# Patient Record
Sex: MALE | Race: ASIAN | NOT HISPANIC OR LATINO | URBAN - METROPOLITAN AREA
[De-identification: names, ages, dates, MRNs, and addresses within clinical notes are randomized per-mention and may not be internally consistent; named-entity substitution may affect disease eponyms.]

---

## 2019-10-05 ENCOUNTER — COMMUNICATION - HEALTHEAST (OUTPATIENT)
Dept: TELEHEALTH | Facility: CLINIC | Age: 49
End: 2019-10-05

## 2019-10-05 ENCOUNTER — OFFICE VISIT - HEALTHEAST (OUTPATIENT)
Dept: FAMILY MEDICINE | Facility: CLINIC | Age: 49
End: 2019-10-05

## 2019-10-05 DIAGNOSIS — Z60.3 LANGUAGE BARRIER: ICD-10-CM

## 2019-10-05 DIAGNOSIS — R03.0 ELEVATED BLOOD PRESSURE READING WITHOUT DIAGNOSIS OF HYPERTENSION: ICD-10-CM

## 2019-10-05 DIAGNOSIS — Z75.8 LANGUAGE BARRIER: ICD-10-CM

## 2019-10-05 LAB
ALBUMIN UR-MCNC: NEGATIVE MG/DL
ANION GAP SERPL CALCULATED.3IONS-SCNC: 10 MMOL/L (ref 5–18)
APPEARANCE UR: CLEAR
BILIRUB UR QL STRIP: NEGATIVE
BUN SERPL-MCNC: 19 MG/DL (ref 8–22)
CHLORIDE BLD-SCNC: 102 MMOL/L (ref 98–107)
CO2 SERPL-SCNC: 32 MMOL/L (ref 22–31)
COLOR UR AUTO: YELLOW
CREAT SERPL-MCNC: 1.3 MG/DL (ref 0.8–1.5)
ERYTHROCYTE [DISTWIDTH] IN BLOOD BY AUTOMATED COUNT: 11.1 % (ref 11–14.5)
GLUCOSE BLD-MCNC: 97 MG/DL (ref 70–125)
GLUCOSE UR STRIP-MCNC: NEGATIVE MG/DL
HCT VFR BLD AUTO: 53.2 % (ref 40–54)
HGB BLD-MCNC: 17.8 G/DL (ref 14–18)
HGB UR QL STRIP: NEGATIVE
KETONES UR STRIP-MCNC: NEGATIVE MG/DL
LEUKOCYTE ESTERASE UR QL STRIP: NEGATIVE
MCH RBC QN AUTO: 29.6 PG (ref 27–34)
MCHC RBC AUTO-ENTMCNC: 33.5 G/DL (ref 32–36)
MCV RBC AUTO: 88 FL (ref 80–100)
NITRATE UR QL: NEGATIVE
PH UR STRIP: 7 [PH] (ref 5–8)
PLATELET # BLD AUTO: 243 THOU/UL (ref 140–440)
PMV BLD AUTO: 7.8 FL (ref 7–10)
POTASSIUM BLD-SCNC: 4.1 MMOL/L (ref 3.5–5.5)
RBC # BLD AUTO: 6.03 MILL/UL (ref 4.4–6.2)
SODIUM SERPL-SCNC: 144 MMOL/L (ref 136–145)
SP GR UR STRIP: 1.02 (ref 1–1.03)
UROBILINOGEN UR STRIP-ACNC: NORMAL
WBC: 6.6 THOU/UL (ref 4–11)

## 2019-10-05 SDOH — SOCIAL STABILITY - SOCIAL INSECURITY: ACCULTURATION DIFFICULTY: Z60.3

## 2021-04-23 ENCOUNTER — AMBULATORY - HEALTHEAST (OUTPATIENT)
Dept: NURSING | Facility: CLINIC | Age: 51
End: 2021-04-23

## 2021-05-02 ENCOUNTER — HEALTH MAINTENANCE LETTER (OUTPATIENT)
Age: 51
End: 2021-05-02

## 2021-05-14 ENCOUNTER — AMBULATORY - HEALTHEAST (OUTPATIENT)
Dept: NURSING | Facility: CLINIC | Age: 51
End: 2021-05-14

## 2021-06-02 NOTE — PROGRESS NOTES
Subjective:   Bijan Briones is a 49 y.o. male and is new to [facility].  Roomed by: REYNALDO LEIGH CMA    Accompanied by Son    Refills needed? No    Do you have any forms that need to be filled out? No      Chief Complaint   Patient presents with     Hypertension     BP check this morning and was 170/100. symptoms slight headache. Nurse check pt's bp this morning and recommended pt to come to [facility] to get check.   Says a nurse came over for a life insurance policy this morning and saw that his BP was elevated. Admits having intermittent headaches in the last few days. Says that when he gets a headache that he trouble focusing. He saw an eye doctor this summer for loss of vision. Was told he has some problem with his right eye and was prescribed 3 different eye drops, including steroid eye drops. He saw several eye specialist and was told he could stop taking the eye drops. Says he did not have any more eye problems until this week. Denies CP or shortness of breath, decreased strengths, numbness or tingling in his arms or legs. Admits being able to eat, drink and urinate normally. Denies any recent nausea, vomiting, belly pain, or diarrhea.     PMH - See HPI, otherwise none  PSH - None  FX - HTN - none, DM - none, Asthma - none, CAD - none, Cancer - none  SX - Lives with wife, 12 year old daughter, son, daughter in law and 1 granddaughter; non smoker, non drinker. Works as     Patient Active Problem List   Diagnosis     Acute Bronchitis     Hyperlipidemia        Review of Systems  See HPI for ROS, otherwise balance of other systems negative  No Known Allergies  No current outpatient medications on file.    Objective:     Vitals:    10/05/19 1255 10/05/19 1318   BP: 166/88 (!) 161/93   Patient Site: Right Arm    Patient Position: Sitting    Cuff Size: Adult Regular    Pulse: (!) 55    Temp: 97.8  F (36.6  C)    TempSrc: Oral    SpO2: 96%    Weight: 147 lb 9 oz (66.9 kg)    Vital signed reviewed  Gen - Pt in NAD  Eyes  - PERRL, EOMI, Conjunctiva without injection or drainage  Ears - hearing intact; external canals - no induration, Right TM - non injected, Left TM - non injected   Nose - not congested, no nasal drainage, turbinates not inflamed, septum not deviated  Pharynx - not injected, tonsils 1+ size  Neck - supple, no cervical adenopathy, no masses  Cor - RRR w/o murmur  Lungs - Good air entry; no wheezes or crackles noted on auscultation - no coughing noted  Neuro: Oriented x 3, CN - 2-12 intact, Sensory - neg drift, Strengths - 5/5 UE/LE = , DTRs =, Coord - intact FNF   Intact tandem gait, negative Rhomberg  Psych - Affect - euthymic, well groomed, speech not pressured, good insight, no flight of ideas  Skin - no lesions, no rashes noted    Denise son  - cell [phone]    Results for orders placed or performed in visit on 10/05/19   HM2(CBC w/o Differential)   Result Value Ref Range    WBC 6.6 4.0 - 11.0 thou/uL    RBC 6.03 4.40 - 6.20 mill/uL    Hemoglobin 17.8 14.0 - 18.0 g/dL    Hematocrit 53.2 40.0 - 54.0 %    MCV 88 80 - 100 fL    MCH 29.6 27.0 - 34.0 pg    MCHC 33.5 32.0 - 36.0 g/dL    RDW 11.1 11.0 - 14.5 %    Platelets 243 140 - 440 thou/uL    MPV 7.8 7.0 - 10.0 fL   ISTAT CHEM 7 ( CLINIC ONLY)   Result Value Ref Range    Sodium 144 136 - 145 mmol/L    Potassium 4.1 3.5 - 5.5 mmol/L    CO2 32 (H) 22 - 31 mmol/L    Chloride 102 98 - 107 mmol/L    Anion Gap, Calculation 10 5 - 18 mmol/L    Glucose 97 70 - 125 mg/dL    BUN 19 8 - 22 mg/dL    Creatinine 1.30 0.80 - 1.50 mg/dL   Urinalysis Macroscopic   Result Value Ref Range    Color, UA Yellow Colorless, Yellow, Straw, Light Yellow    Clarity, UA Clear Clear    Glucose, UA Negative Negative    Bilirubin, UA Negative Negative    Ketones, UA Negative Negative    Specific Gravity, UA 1.020 1.005 - 1.030    Blood, UA Negative Negative    pH, UA 7.0 5.0 - 8.0    Protein, UA Negative Negative mg/dL    Urobilinogen, UA 0.2 E.U./dL 0.2 E.U./dL, 1.0 E.U./dL    Nitrite, UA  Negative Negative    Leukocytes, UA Negative Negative   Lab result discussed on day of visit.      Assessment - Plan   Medical Decision Making -49-year-old Hmong only speaking man is here with his son interpreting.  He states is never been told he had increased blood pressure.  His blood pressure is increased blood pressure was found subsequently by a nurse who was doing insurance evaluation this morning.  He does admit some intermittent headaches in the last several days.  Patient denies any recent chest pain, shortness of breath, decreased sensation or strengths.  Does have a history of a right eye pterygium for which she has been seen by ophthalmology.  On exam his blood pressure was elevated x2 but balance of vital signs were reassuring.  Except for his right eye pterygium, the balance of his exam was balance of exam was unremarkable, including an entirely normal neuro exam.  Discussed patient's CBC, i-STAT and urinalysis with his son by phone.  From today's exam discussed with patient and son that his blood pressure was not in the stroke range and patient was not giving any symptoms of heart problems.  Patient will follow-up with primary care within the next 7 days.  See patient instructions.    1. Elevated blood pressure reading without diagnosis of hypertension  - HM2(CBC w/o Differential)  - ISTAT CHEM 7 ([facility] CLINIC ONLY)  - Urinalysis Macroscopic  - Nursing communication    2. Language barrier  At the conclusion of the encounter, assessment and plan were discussed. All questions were answered. The patient or guardian acknowledged understanding and was involved in the decision making regarding the overall care plan.    Patient Instructions   1. Keep appointment for follow up with primary care provider  2. If symptoms are getting worse over time or you have any questions, call the clinic number - it's answered 24/7      Patient Education   What is High Blood Pressure?  High blood pressure (also called  hypertension) is known as the  silent killer.  This is because most of the time it doesn t cause symptoms. In fact, many people don t know they have it until other problems develop. In most cases, high blood pressure often requires lifelong treatment.  Understanding blood pressure  The circulatory system is made up of the heart and blood vessels that carry blood through the body. Your heart is the pump for this system. With each heartbeat (contraction), the heart sends blood out through large blood vessels called arteries. Blood pressure is a measure of how hard the moving blood pushes against the walls of the arteries.  High blood pressure can harm your health  In a healthy blood vessel, the blood moves smoothly through the vessel and puts normal pressure on the vessel walls.  High blood pressure occurs when blood pushes too hard against artery walls. This causes damage to the artery walls and then the formation of scar tissue as it heals. This makes the arteries stiff and weak. Plaque sticks to the scarred tissue narrowing and hardening the arteries. High blood pressure also causes your heart to work harder to get blood out to the body. High blood pressure raises your risk of heart attack, also known as acute myocardial infarction, or AMI, heart failure, and stroke. It can also lead to kidney disease, and blindness. In general, if you have high blood pressure, keeping your blood pressure below 130/80 mmHg may help prevent these problems. Your healthcare provider may prescribe medicine to help control blood pressure if lifestyle changes are not enough.  It's important to know your blood pressure numbers. Blood pressure measurements are given as 2 numbers. Systolic blood pressure is the upper number. This is the pressure when the heart contracts. Diastolic blood pressure is the lower number. This is the pressure when the heart relaxes between beats.  Blood pressure is categorized as normal, elevated, or stage 1 or  "stage 2 high blood pressure:    Normal blood pressure is systolic of less than 120 and diastolic of less than 80 (120/80)    Elevated blood pressure is systolic of 120 to 129 and diastolic less than 80    Stage 1 high blood pressure is systolic is 130 to 139 or diastolic between 80 to 89    Stage 2 high blood pressure is when systolic is 140 or higher or the diastolic is 90 or higher  High blood pressure is diagnosed when multiple, separate readings show blood pressure above 130/80 mmHg. Talk with your healthcare provider if you have questions or concerns about your blood pressure readings.  Measuring blood pressure  An example of a blood pressure measurement is 120/70 (120 over 70). The top number is the pressure of blood against the artery walls during a heartbeat (systolic). The bottom number is the pressure of blood against artery walls between heartbeats (diastolic). Talk with your healthcare provider to find out what your blood pressure goals should be.   Controlling blood pressure  If your blood pressure is too high, work with your doctor on a plan for lowering it. Below are steps you can take that will help lower your blood pressure.    Choose heart-healthy foods. Eating healthier meals helps you control your blood pressure. Ask your doctor about the DASH eating plan. This plan helps reduce blood pressure by limiting the amount of sodium (salt) you have in your diet. DASH also encourages eating plenty of fruits and vegetables, low-fat or non-fat dairy, whole-grains, and foods high in fiber, and low in fat. This also provides an enhanced amount of potassium which can also help lower blood pressure.    Reduce sodium. Reducing sodium in your diet reduces fluid retention. Fluid retention caused by too much salt increases blood volume and blood pressure. The American Heart Association s (AHA) \"ideal\" sodium intake recommendation is 1,500 milligrams per day.  However, since American's eat so much salt, the AHA " says a positive change can occur by cutting back to even 2,400 milligrams of sodium a day.    Maintain a healthy weight. Being overweight makes you more likely to have high blood pressure. Losing excess weight helps lower blood pressure.    Exercise regularly. Daily exercise helps your heart and blood vessels work better and stay healthier. It can help lower your blood pressure.    Stop smoking. Smoking increases blood pressure and damages blood vessels.    Limit alcohol. Drinking too much alcohol can raise blood pressure. Men should have no more than 2 drinks a day. Women should have no more than 1. (A drink is equal to 1 beer, or a small glass of wine, or a shot of liquor.)    Control stress. Stress makes your heart work harder and beat faster. Controlling stress helps you control your blood pressure.  Facts about high blood pressure    Feeling OK does not mean that blood pressure is under control. Likewise, feeling bad doesn t mean it s out of control. The only way to know for sure is to check your pressure regularly.    Medicine is only one part of controlling high blood pressure. You also need to manage your weight, get regular exercise, and adjust your eating habits.    High blood pressure is usually a lifelong problem. But it can be controlled with healthy lifestyle changes and medicine.    Hypertension is not the same as stress. Although stress may be a factor in high blood pressure, it s only one part of the story.    Blood pressure medicines need to be taken every day. Stopping suddenly may cause a dangerous increase in pressure.  Date Last Reviewed: 4/27/2016 2000-2019 [employer]. [address] [place] [address], [place] [address]. All rights reserved. This information is not intended as a substitute for professional medical care. Always follow your healthcare professional's instructions.

## 2021-06-03 VITALS
HEART RATE: 55 BPM | TEMPERATURE: 97.8 F | OXYGEN SATURATION: 96 % | SYSTOLIC BLOOD PRESSURE: 161 MMHG | WEIGHT: 147.56 LBS | DIASTOLIC BLOOD PRESSURE: 93 MMHG

## 2021-10-16 ENCOUNTER — HEALTH MAINTENANCE LETTER (OUTPATIENT)
Age: 51
End: 2021-10-16

## 2021-12-22 ENCOUNTER — IMMUNIZATION (OUTPATIENT)
Dept: NURSING | Facility: CLINIC | Age: 51
End: 2021-12-22
Payer: COMMERCIAL

## 2021-12-22 PROCEDURE — 91300 PR COVID VAC PFIZER DIL RECON 30 MCG/0.3 ML IM: CPT

## 2021-12-22 PROCEDURE — 0004A PR COVID VAC PFIZER DIL RECON 30 MCG/0.3 ML IM: CPT

## 2022-05-28 ENCOUNTER — HEALTH MAINTENANCE LETTER (OUTPATIENT)
Age: 52
End: 2022-05-28

## 2022-10-01 ENCOUNTER — HEALTH MAINTENANCE LETTER (OUTPATIENT)
Age: 52
End: 2022-10-01

## 2023-06-04 ENCOUNTER — HEALTH MAINTENANCE LETTER (OUTPATIENT)
Age: 53
End: 2023-06-04

## 2024-07-21 ENCOUNTER — HEALTH MAINTENANCE LETTER (OUTPATIENT)
Age: 54
End: 2024-07-21